# Patient Record
Sex: FEMALE | Race: WHITE | NOT HISPANIC OR LATINO | URBAN - METROPOLITAN AREA
[De-identification: names, ages, dates, MRNs, and addresses within clinical notes are randomized per-mention and may not be internally consistent; named-entity substitution may affect disease eponyms.]

---

## 2023-10-18 ENCOUNTER — APPOINTMENT (OUTPATIENT)
Dept: PRIMARY CARE | Facility: CLINIC | Age: 53
End: 2023-10-18
Payer: MEDICAID

## 2024-04-23 ENCOUNTER — APPOINTMENT (OUTPATIENT)
Dept: PRIMARY CARE | Facility: CLINIC | Age: 54
End: 2024-04-23
Payer: MEDICAID

## 2024-06-11 ENCOUNTER — OFFICE VISIT (OUTPATIENT)
Dept: PRIMARY CARE | Facility: CLINIC | Age: 54
End: 2024-06-11
Payer: MEDICAID

## 2024-06-11 VITALS
HEART RATE: 84 BPM | WEIGHT: 189.8 LBS | BODY MASS INDEX: 31.62 KG/M2 | HEIGHT: 65 IN | SYSTOLIC BLOOD PRESSURE: 108 MMHG | DIASTOLIC BLOOD PRESSURE: 72 MMHG

## 2024-06-11 DIAGNOSIS — Z23 ENCOUNTER FOR IMMUNIZATION: ICD-10-CM

## 2024-06-11 DIAGNOSIS — Z12.31 ENCOUNTER FOR SCREENING MAMMOGRAM FOR BREAST CANCER: ICD-10-CM

## 2024-06-11 DIAGNOSIS — F41.9 ANXIETY: ICD-10-CM

## 2024-06-11 DIAGNOSIS — C56.2 MALIGNANT NEOPLASM OF LEFT OVARY (MULTI): ICD-10-CM

## 2024-06-11 DIAGNOSIS — R22.1 NECK MASS: ICD-10-CM

## 2024-06-11 DIAGNOSIS — F17.210 CIGARETTE NICOTINE DEPENDENCE WITHOUT COMPLICATION: ICD-10-CM

## 2024-06-11 DIAGNOSIS — Z00.00 ROUTINE GENERAL MEDICAL EXAMINATION AT A HEALTH CARE FACILITY: ICD-10-CM

## 2024-06-11 DIAGNOSIS — F32.0 CURRENT MILD EPISODE OF MAJOR DEPRESSIVE DISORDER WITHOUT PRIOR EPISODE (CMS-HCC): ICD-10-CM

## 2024-06-11 DIAGNOSIS — Z12.11 ENCOUNTER FOR SCREENING FOR MALIGNANT NEOPLASM OF COLON: ICD-10-CM

## 2024-06-11 DIAGNOSIS — M06.9 RHEUMATOID ARTHRITIS, INVOLVING UNSPECIFIED SITE, UNSPECIFIED WHETHER RHEUMATOID FACTOR PRESENT (MULTI): Primary | ICD-10-CM

## 2024-06-11 PROCEDURE — 90471 IMMUNIZATION ADMIN: CPT | Performed by: STUDENT IN AN ORGANIZED HEALTH CARE EDUCATION/TRAINING PROGRAM

## 2024-06-11 PROCEDURE — 90677 PCV20 VACCINE IM: CPT | Performed by: STUDENT IN AN ORGANIZED HEALTH CARE EDUCATION/TRAINING PROGRAM

## 2024-06-11 PROCEDURE — 99204 OFFICE O/P NEW MOD 45 MIN: CPT | Performed by: STUDENT IN AN ORGANIZED HEALTH CARE EDUCATION/TRAINING PROGRAM

## 2024-06-11 RX ORDER — ESCITALOPRAM OXALATE 20 MG/1
20 TABLET ORAL DAILY
COMMUNITY
Start: 2023-10-26

## 2024-06-11 RX ORDER — SENNOSIDES 8.6 MG/1
8.6 TABLET ORAL DAILY PRN
COMMUNITY
Start: 2023-01-12

## 2024-06-11 RX ORDER — ACETAMINOPHEN 325 MG/1
TABLET ORAL EVERY 6 HOURS PRN
COMMUNITY

## 2024-06-11 RX ORDER — DOCUSATE SODIUM 100 MG/1
100 CAPSULE, LIQUID FILLED ORAL 2 TIMES DAILY PRN
COMMUNITY

## 2024-06-11 ASSESSMENT — ENCOUNTER SYMPTOMS
FLANK PAIN: 0
FEVER: 0
CHILLS: 0
DIARRHEA: 0
COUGH: 0
HEADACHES: 0
EYE PAIN: 0
NERVOUS/ANXIOUS: 0
SHORTNESS OF BREATH: 0
DYSPHORIC MOOD: 0
EYE REDNESS: 0
DIZZINESS: 0
NUMBNESS: 0
VOMITING: 0
PALPITATIONS: 0
BLOOD IN STOOL: 0
NAUSEA: 0
CONSTIPATION: 0
DYSURIA: 0
RHINORRHEA: 0
ABDOMINAL PAIN: 0
ARTHRALGIAS: 1

## 2024-06-11 ASSESSMENT — PATIENT HEALTH QUESTIONNAIRE - PHQ9
SUM OF ALL RESPONSES TO PHQ9 QUESTIONS 1 AND 2: 4
2. FEELING DOWN, DEPRESSED OR HOPELESS: MORE THAN HALF THE DAYS
1. LITTLE INTEREST OR PLEASURE IN DOING THINGS: MORE THAN HALF THE DAYS

## 2024-06-11 NOTE — ASSESSMENT & PLAN NOTE
Encouraged her to follow up with Dr. Ramos. She was instructed to let us know if she has any trouble scheduling follow up appt.

## 2024-06-11 NOTE — PROGRESS NOTES
Subjective   Patient ID: Janee Duval is a 53 y.o. female who presents for establish care. Previous provider in [place] in 2009. Moved [place] -> [place] -> [place] -> [place] 2020.    HPI  Ovarian cancer - initially started as thoracic back pain, pain was progressive so eventually went to ED and had CTAP and then TVUS which revealed pelvic mass, was referred to local gynecology (Dr. Cortez), was further referred to gynonc (Dr. Estrella), underwent ODALYS BSO 1/10/2023, had reaction to initial chemo regimen but ultimately completed 6 cycles of carboplatin 6/29/2023, states was told she was in remission end of July 2023, she now follows with Dr. Estrella q3mo, last eval 5/2/2024, CTAP with CESIA 4/25/2024, scheduled to see again 8/22/2024    RA - was diagnosed age 14, but she states didn't realize this until recently when her mother reminded her, she is not sure about treatment as adolescent, pain is mainly localized to knees bilaterally but does have diffuse joint pain that started getting worse prior to her cancer diagnosis, struggles with stiffness in the hands in the mornings, does get swelling in the lower extremities, knees, hands    Posterior neck mess - first noticed as teenager, was evaluated by ENT (Dr. Ramos) 12/20/2022, CT was done which was c/w possible epithelial inclusion cyst, getting more uncomfortable with time but she's not sure if it has grown in size, planning to call Dr. Ramos's office to schedule follow up as she was treated for ovarian cancer soon after her CT    Anx/dep - was started on Lexapro by gynonc which seems to be helping, does have hx SI but Lexapro helping significantly, feeling more balanced with the medication    Cervical Cancer Screening: s/p hyst, follows with gynonc  Breast Cancer Screening: paternal aunt with breast ca, due  Osteoporosis Screening: plan to start at age 65  Colon Cancer Screening: no fhx, asymptomatic, due for initial  Tobacco: 10-15 pack-yr hx, down to about 4 cigarettes per day  Alcohol:  "social, occasional  Recreational Drugs: denies  Immunizations: due for TDaP, [id number], Shingrix, she will consider Shingrix, [id number] today    Review of Systems   Constitutional:  Negative for chills and fever.   HENT:  Negative for congestion and rhinorrhea.    Eyes:  Negative for pain and redness.   Respiratory:  Negative for cough and shortness of breath.    Cardiovascular:  Positive for leg swelling. Negative for chest pain and palpitations.   Gastrointestinal:  Negative for abdominal pain, blood in stool, constipation, diarrhea, nausea and vomiting.   Endocrine: Negative for cold intolerance and heat intolerance.   Genitourinary:  Negative for dysuria and flank pain.   Musculoskeletal:  Positive for arthralgias.   Skin:  Negative for rash.   Neurological:  Negative for dizziness, numbness and headaches.   Psychiatric/Behavioral:  Negative for dysphoric mood. The patient is not nervous/anxious.      Objective   /72   Pulse 84   Ht 1.645 m (5' 4.75\")   Wt 86.1 kg (189 lb 12.8 oz)   BMI 31.83 kg/m²     Physical Exam  Vitals reviewed.   Constitutional:       General: She is not in acute distress.     Appearance: Normal appearance.   HENT:      Head: Normocephalic and atraumatic.      Right Ear: Tympanic membrane, ear canal and external ear normal.      Left Ear: Tympanic membrane, ear canal and external ear normal.   Eyes:      General: No scleral icterus.     Conjunctiva/sclera: Conjunctivae normal.   Neck:      Comments: 6cm firm mobile mass posterior neck, nontender  Cardiovascular:      Rate and Rhythm: Normal rate and regular rhythm.   Pulmonary:      Effort: Pulmonary effort is normal. No respiratory distress.      Breath sounds: Normal breath sounds.   Abdominal:      General: Bowel sounds are normal. There is no distension.      Palpations: Abdomen is soft.      Tenderness: There is no abdominal tenderness. There is no guarding or rebound.   Musculoskeletal:         General: Swelling (trace " pitting edema bl lower extremities to the mid calf) present. No deformity.      Cervical back: Normal range of motion and neck supple.   Skin:     General: Skin is warm and dry.      Findings: No rash.   Neurological:      General: No focal deficit present.      Mental Status: She is alert.   Psychiatric:         Mood and Affect: Mood normal.         Behavior: Behavior normal.       Assessment/Plan   Problem List Items Addressed This Visit             ICD-10-CM    Rheumatoid arthritis (Multi) - Primary M06.9     Diagnosed as adolescent per pt report. Currently struggling with diffuse joint pain (knees worst), stiffness, swelling. We will start by updating labs and will follow up with results. Will plan for rheumatology eval pending lab results and clinical course.         Relevant Orders    CBC and Auto Differential    Comprehensive Metabolic Panel    VENANCIO with Reflex to KAYLA    C-Reactive Protein    Rheumatoid Factor    Sedimentation Rate    Neck mass R22.1     Encouraged her to follow up with Dr. Ramos. She was instructed to let us know if she has any trouble scheduling follow up appt.         Malignant neoplasm of left ovary (Multi) C56.2     Continue close follow up with gynonc as previously scheduled for 8/2024.         Current mild episode of major depressive disorder without prior episode (CMS-HCC) F32.0     Well-controlled on current dose of Lexapro. Will continue.         Cigarette nicotine dependence without complication F17.210     She is working on weaning. Down to 4 cigarettes per day. Encouraged complete cessation.         Anxiety F41.9     Well-controlled on current dose of Lexapro. Will continue.          Other Visit Diagnoses         Codes    Routine general medical examination at a health care facility     Z00.00    Relevant Orders    CBC and Auto Differential    Comprehensive Metabolic Panel    Lipid Panel    VENANCIO with Reflex to KAYLA    C-Reactive Protein    Rheumatoid Factor    Sedimentation Rate     Follow Up In Primary Care - Established    Encounter for screening mammogram for breast cancer     Z12.31    Relevant Orders    BI mammo bilateral screening tomosynthesis    Follow Up In Primary Care - Established    Encounter for screening for malignant neoplasm of colon     Z12.11    Relevant Orders    Referral to Gastroenterology    Follow Up In Primary Care - Established    Encounter for immunization     Z23    Relevant Orders    Pneumococcal conjugate vaccine, 20-valent (PREVNAR 20) (Completed)    Follow Up In Primary Care - Established        Follow up in 3mo for recheck, sooner if needed.

## 2024-06-11 NOTE — ASSESSMENT & PLAN NOTE
Diagnosed as adolescent per pt report. Currently struggling with diffuse joint pain (knees worst), stiffness, swelling. We will start by updating labs and will follow up with results. Will plan for rheumatology eval pending lab results and clinical course.

## 2024-06-18 ENCOUNTER — HOSPITAL ENCOUNTER (OUTPATIENT)
Dept: RADIOLOGY | Facility: CLINIC | Age: 54
Discharge: HOME | End: 2024-06-18
Payer: MEDICAID

## 2024-06-18 VITALS — WEIGHT: 189 LBS | BODY MASS INDEX: 32.27 KG/M2 | HEIGHT: 64 IN

## 2024-06-18 DIAGNOSIS — Z12.31 ENCOUNTER FOR SCREENING MAMMOGRAM FOR BREAST CANCER: ICD-10-CM

## 2024-06-18 PROCEDURE — 77063 BREAST TOMOSYNTHESIS BI: CPT | Performed by: RADIOLOGY

## 2024-06-18 PROCEDURE — 77067 SCR MAMMO BI INCL CAD: CPT

## 2024-06-18 PROCEDURE — 77067 SCR MAMMO BI INCL CAD: CPT | Performed by: RADIOLOGY

## 2024-06-19 ENCOUNTER — HOSPITAL ENCOUNTER (OUTPATIENT)
Dept: RADIOLOGY | Facility: EXTERNAL LOCATION | Age: 54
Discharge: HOME | End: 2024-06-19

## 2024-06-25 ENCOUNTER — TELEPHONE (OUTPATIENT)
Dept: PRIMARY CARE | Facility: CLINIC | Age: 54
End: 2024-06-25
Payer: MEDICAID

## 2024-06-25 NOTE — TELEPHONE ENCOUNTER
---- Message -----   From: Aria Ny DO   Sent: 6/21/2024   To: Janee Duval   Subject: Unread Message Notification                      Good morning Janee! Mammogram looks good. We'll plan to repeat again in a year. Hope you have a nice weekend!         Pt notified

## 2024-09-05 ENCOUNTER — LAB (OUTPATIENT)
Dept: LAB | Facility: LAB | Age: 54
End: 2024-09-05
Payer: MEDICAID

## 2024-09-05 DIAGNOSIS — M06.9 RHEUMATOID ARTHRITIS, INVOLVING UNSPECIFIED SITE, UNSPECIFIED WHETHER RHEUMATOID FACTOR PRESENT (MULTI): ICD-10-CM

## 2024-09-05 DIAGNOSIS — Z00.00 ROUTINE GENERAL MEDICAL EXAMINATION AT A HEALTH CARE FACILITY: ICD-10-CM

## 2024-09-05 LAB
ALBUMIN SERPL BCP-MCNC: 4.1 G/DL (ref 3.4–5)
ALP SERPL-CCNC: 106 U/L (ref 33–110)
ALT SERPL W P-5'-P-CCNC: 47 U/L (ref 7–45)
ANION GAP SERPL CALC-SCNC: 13 MMOL/L (ref 10–20)
AST SERPL W P-5'-P-CCNC: 26 U/L (ref 9–39)
BASOPHILS # BLD AUTO: 0.1 X10*3/UL (ref 0–0.1)
BASOPHILS NFR BLD AUTO: 1.2 %
BILIRUB SERPL-MCNC: 0.4 MG/DL (ref 0–1.2)
BUN SERPL-MCNC: 13 MG/DL (ref 6–23)
CALCIUM SERPL-MCNC: 9.3 MG/DL (ref 8.6–10.3)
CHLORIDE SERPL-SCNC: 103 MMOL/L (ref 98–107)
CHOLEST SERPL-MCNC: 235 MG/DL (ref 0–199)
CHOLESTEROL/HDL RATIO: 6.7
CO2 SERPL-SCNC: 28 MMOL/L (ref 21–32)
CREAT SERPL-MCNC: 0.68 MG/DL (ref 0.5–1.05)
CRP SERPL-MCNC: 0.61 MG/DL
EGFRCR SERPLBLD CKD-EPI 2021: >90 ML/MIN/1.73M*2
EOSINOPHIL # BLD AUTO: 0.61 X10*3/UL (ref 0–0.7)
EOSINOPHIL NFR BLD AUTO: 7.3 %
ERYTHROCYTE [DISTWIDTH] IN BLOOD BY AUTOMATED COUNT: 13.1 % (ref 11.5–14.5)
ERYTHROCYTE [SEDIMENTATION RATE] IN BLOOD BY WESTERGREN METHOD: 15 MM/H (ref 0–30)
GLUCOSE SERPL-MCNC: 131 MG/DL (ref 74–99)
HCT VFR BLD AUTO: 41.9 % (ref 36–46)
HDLC SERPL-MCNC: 35 MG/DL
HGB BLD-MCNC: 13.5 G/DL (ref 12–16)
IMM GRANULOCYTES # BLD AUTO: 0.02 X10*3/UL (ref 0–0.7)
IMM GRANULOCYTES NFR BLD AUTO: 0.2 % (ref 0–0.9)
LDLC SERPL CALC-MCNC: 145 MG/DL
LYMPHOCYTES # BLD AUTO: 1.88 X10*3/UL (ref 1.2–4.8)
LYMPHOCYTES NFR BLD AUTO: 22.6 %
MCH RBC QN AUTO: 31.1 PG (ref 26–34)
MCHC RBC AUTO-ENTMCNC: 32.2 G/DL (ref 32–36)
MCV RBC AUTO: 97 FL (ref 80–100)
MONOCYTES # BLD AUTO: 0.65 X10*3/UL (ref 0.1–1)
MONOCYTES NFR BLD AUTO: 7.8 %
NEUTROPHILS # BLD AUTO: 5.07 X10*3/UL (ref 1.2–7.7)
NEUTROPHILS NFR BLD AUTO: 60.9 %
NON HDL CHOLESTEROL: 200 MG/DL (ref 0–149)
NRBC BLD-RTO: 0 /100 WBCS (ref 0–0)
PLATELET # BLD AUTO: 354 X10*3/UL (ref 150–450)
POTASSIUM SERPL-SCNC: 4.4 MMOL/L (ref 3.5–5.3)
PROT SERPL-MCNC: 6.5 G/DL (ref 6.4–8.2)
RBC # BLD AUTO: 4.34 X10*6/UL (ref 4–5.2)
SODIUM SERPL-SCNC: 140 MMOL/L (ref 136–145)
TRIGL SERPL-MCNC: 275 MG/DL (ref 0–149)
VLDL: 55 MG/DL (ref 0–40)
WBC # BLD AUTO: 8.3 X10*3/UL (ref 4.4–11.3)

## 2024-09-05 PROCEDURE — 36415 COLL VENOUS BLD VENIPUNCTURE: CPT

## 2024-09-05 PROCEDURE — 86140 C-REACTIVE PROTEIN: CPT

## 2024-09-05 PROCEDURE — 85652 RBC SED RATE AUTOMATED: CPT

## 2024-09-05 PROCEDURE — 85025 COMPLETE CBC W/AUTO DIFF WBC: CPT

## 2024-09-05 PROCEDURE — 86431 RHEUMATOID FACTOR QUANT: CPT

## 2024-09-05 PROCEDURE — 80061 LIPID PANEL: CPT

## 2024-09-05 PROCEDURE — 86038 ANTINUCLEAR ANTIBODIES: CPT

## 2024-09-05 PROCEDURE — 86235 NUCLEAR ANTIGEN ANTIBODY: CPT

## 2024-09-05 PROCEDURE — 80053 COMPREHEN METABOLIC PANEL: CPT

## 2024-09-05 PROCEDURE — 86225 DNA ANTIBODY NATIVE: CPT

## 2024-09-06 LAB
ANA PATTERN: ABNORMAL
ANA SER QL HEP2 SUBST: POSITIVE
ANA TITR SER IF: ABNORMAL {TITER}
CENTROMERE B AB SER-ACNC: <0.2 AI
CHROMATIN AB SERPL-ACNC: <0.2 AI
DSDNA AB SER-ACNC: 1 IU/ML
ENA JO1 AB SER QL IA: <0.2 AI
ENA RNP AB SER IA-ACNC: <0.2 AI
ENA SCL70 AB SER QL IA: 1.2 AI
ENA SM AB SER IA-ACNC: <0.2 AI
ENA SM+RNP AB SER QL IA: <0.2 AI
ENA SS-A AB SER IA-ACNC: 0.2 AI
ENA SS-B AB SER IA-ACNC: <0.2 AI
RHEUMATOID FACT SER NEPH-ACNC: <10 IU/ML (ref 0–15)
RIBOSOMAL P AB SER-ACNC: <0.2 AI

## 2024-09-11 ENCOUNTER — APPOINTMENT (OUTPATIENT)
Dept: PRIMARY CARE | Facility: CLINIC | Age: 54
End: 2024-09-11
Payer: MEDICAID

## 2024-09-11 VITALS
BODY MASS INDEX: 33.67 KG/M2 | DIASTOLIC BLOOD PRESSURE: 74 MMHG | HEIGHT: 64 IN | HEART RATE: 84 BPM | SYSTOLIC BLOOD PRESSURE: 120 MMHG | WEIGHT: 197.2 LBS

## 2024-09-11 DIAGNOSIS — E78.5 DYSLIPIDEMIA: Primary | ICD-10-CM

## 2024-09-11 DIAGNOSIS — R06.83 SNORING: ICD-10-CM

## 2024-09-11 DIAGNOSIS — M06.9 RHEUMATOID ARTHRITIS, INVOLVING UNSPECIFIED SITE, UNSPECIFIED WHETHER RHEUMATOID FACTOR PRESENT (MULTI): ICD-10-CM

## 2024-09-11 DIAGNOSIS — R73.01 ELEVATED FASTING GLUCOSE: ICD-10-CM

## 2024-09-11 DIAGNOSIS — I89.0 LYMPHEDEMA: ICD-10-CM

## 2024-09-11 DIAGNOSIS — R40.0 DAYTIME SOMNOLENCE: ICD-10-CM

## 2024-09-11 DIAGNOSIS — R21 RASH: ICD-10-CM

## 2024-09-11 DIAGNOSIS — F17.210 CIGARETTE NICOTINE DEPENDENCE WITHOUT COMPLICATION: ICD-10-CM

## 2024-09-11 PROCEDURE — 3008F BODY MASS INDEX DOCD: CPT | Performed by: STUDENT IN AN ORGANIZED HEALTH CARE EDUCATION/TRAINING PROGRAM

## 2024-09-11 PROCEDURE — 99214 OFFICE O/P EST MOD 30 MIN: CPT | Performed by: STUDENT IN AN ORGANIZED HEALTH CARE EDUCATION/TRAINING PROGRAM

## 2024-09-11 NOTE — PROGRESS NOTES
Subjective   Patient ID: Janee Duval is a 53 y.o. female who presents for 3 month check with labs     HPI  Lymphedema - bilateral lower extremities, was referred to PT by gynonc, now tx with LE compression, going to PT weekly    Rash - started on L foot, now also on R foot, started a few months ago, itches, she has tried otc hydrocortisone which has helped with the itching, she did bring up to her specialty team who rx triamcinolone, she has not yet picked up from pharmacy, she denies new known exposures to the area    Fatigue - reports significant fatigue since chemo tx, she does snore, denies PND    HLD - FLP above goal with , she prefers to focus on dietary modification, does not eat much red meat or fried foods, uses air fryer, drinks whole milk most days and uses in her coffee    Smoking cessation - continues to work on weaning    RA - pt mother is present today and states that she was tried on multiple different medications for RA as a child, she has not followed with rheumatology for some years, VENANCIO+ on recent labs, she is interested in reestablishment with rheumatology    Cervical Cancer Screening: s/p hyst, follows with [facility]  Breast Cancer Screening: due 6/2025  Osteoporosis Screening: plan to start at age 65  Colon Cancer Screening: scheduled with Dr. Ortiz 10/14/2024  Tobacco: 20 pack-yr hx, down to about 4 cigarettes per day, due for LDCT  Alcohol: social, occasional  Recreational Drugs: denies  Immunizations: due for TDaP and Shingrix, she will consider Shingrix    Review of Systems   Constitutional:  Negative for chills and fever.   Respiratory:  Negative for cough and shortness of breath.    Cardiovascular:  Positive for leg swelling. Negative for chest pain and palpitations.   Musculoskeletal:  Positive for arthralgias.   Skin:  Positive for rash.   Psychiatric/Behavioral:  Negative for dysphoric mood. The patient is not nervous/anxious.      Objective   /74   Pulse 84   Ht  "1.626 m (5' 4\")   Wt 89.4 kg (197 lb 3.2 oz)   BMI 33.85 kg/m²     Physical Exam  Constitutional:       Appearance: Normal appearance.   HENT:      Head: Normocephalic and atraumatic.   Eyes:      General: No scleral icterus.     Conjunctiva/sclera: Conjunctivae normal.   Cardiovascular:      Rate and Rhythm: Normal rate and regular rhythm.      Heart sounds: No murmur heard.  Pulmonary:      Effort: Pulmonary effort is normal. No respiratory distress.      Breath sounds: Normal breath sounds.   Musculoskeletal:      Cervical back: Normal range of motion and neck supple.   Skin:     General: Skin is warm and dry.      Findings: Rash (aculopapular rash tops of feet bl) present.   Neurological:      General: No focal deficit present.      Mental Status: She is alert.   Psychiatric:         Mood and Affect: Mood normal.         Behavior: Behavior normal.       Assessment/Plan   Problem List Items Addressed This Visit             ICD-10-CM    Snoring R06.83     Snoring, fatigue, daytime somnolence. Using shared decision making, we decided to proceed with HST to evaluate for underlying RAZ.         Relevant Orders    Home sleep apnea test (HSAT)    Follow Up In Primary Care - Established    Rheumatoid arthritis (Multi) M06.9     Using shared decision making, we decided to proceed with rheumatology reestablishment.         Relevant Orders    Referral to Rheumatology    Follow Up In Primary Care - Established    Rash R21     Maculopapular rash on feet bilaterally L>R. Unclear etiology at this time. I agree with trial of topical steroid. Medication dosing and side effects reviewed. Return precautions reviewed.          Lymphedema I89.0     Continue close follow up with lymphedema clinic as previously scheduled.         Elevated fasting glucose R73.01     . Will check HbA1c for further characterization.         Relevant Orders    Hemoglobin A1C    Comprehensive Metabolic Panel    Follow Up In Primary Care - " Established    Dyslipidemia - Primary E78.5     FLP reviewed. Statin indicated due to ASCVD 10-yr risk. She prefers to focus on lifestyle modifications first. Will repeat FLP prior to next appt.         Relevant Orders    Comprehensive Metabolic Panel    Lipid Panel    Follow Up In Primary Care - Established    Daytime somnolence R40.0     HST.         Relevant Orders    Home sleep apnea test (HSAT)    Follow Up In Primary Care - Established    Cigarette nicotine dependence without complication F17.210     Encouraged complete smoking cessation. Eligible for LDCT and is agreeable.         Relevant Orders    CT lung screening low dose    Follow Up In Primary Care - Established   Follow up in 3mo for recheck, sooner if needed.

## 2024-09-11 NOTE — LETTER
September 13, 2024     Patient: Janee Duval   YOB: 1970   Date of Visit: 9/11/2024       To Whom It May Concern:    Janee Duval was seen in my clinic on 9/11/2024 at 11:20 am. She is under my care for her chronic medical conditions. Due to these conditions, I do not feel that she is fit for work at this time    If you have any questions or concerns, please don't hesitate to call.         Sincerely,         Aria Ny, DO

## 2024-09-15 PROBLEM — E78.5 DYSLIPIDEMIA: Status: ACTIVE | Noted: 2024-09-15

## 2024-09-15 PROBLEM — R40.0 DAYTIME SOMNOLENCE: Status: ACTIVE | Noted: 2024-09-15

## 2024-09-15 PROBLEM — I89.0 LYMPHEDEMA: Status: ACTIVE | Noted: 2024-09-15

## 2024-09-15 PROBLEM — R06.83 SNORING: Status: ACTIVE | Noted: 2024-09-15

## 2024-09-15 PROBLEM — R21 RASH: Status: ACTIVE | Noted: 2024-09-15

## 2024-09-15 PROBLEM — R73.01 ELEVATED FASTING GLUCOSE: Status: ACTIVE | Noted: 2024-09-15

## 2024-09-15 ASSESSMENT — ENCOUNTER SYMPTOMS
DYSPHORIC MOOD: 0
SHORTNESS OF BREATH: 0
PALPITATIONS: 0
ARTHRALGIAS: 1
FEVER: 0
CHILLS: 0
NERVOUS/ANXIOUS: 0
COUGH: 0

## 2024-09-16 NOTE — ASSESSMENT & PLAN NOTE
Maculopapular rash on feet bilaterally L>R. Unclear etiology at this time. I agree with trial of topical steroid. Medication dosing and side effects reviewed. Return precautions reviewed.

## 2024-09-16 NOTE — ASSESSMENT & PLAN NOTE
Snoring, fatigue, daytime somnolence. Using shared decision making, we decided to proceed with HST to evaluate for underlying RAZ.

## 2024-09-16 NOTE — ASSESSMENT & PLAN NOTE
FLP reviewed. Statin indicated due to ASCVD 10-yr risk. She prefers to focus on lifestyle modifications first. Will repeat FLP prior to next appt.

## 2024-10-01 DIAGNOSIS — M06.9 RHEUMATOID ARTHRITIS, INVOLVING UNSPECIFIED SITE, UNSPECIFIED WHETHER RHEUMATOID FACTOR PRESENT (MULTI): Primary | ICD-10-CM

## 2024-10-08 ENCOUNTER — TELEPHONE (OUTPATIENT)
Dept: PRIMARY CARE | Facility: CLINIC | Age: 54
End: 2024-10-08
Payer: MEDICAID

## 2024-10-08 NOTE — TELEPHONE ENCOUNTER
----- Message from Aria Ny sent at 10/7/2024  7:09 AM EDT -----  Please let Janee know that her lung scan did not show any concerning lung nodules. Good news. We will plan to repeat in 1yr. Thank you

## 2024-12-05 ENCOUNTER — LAB (OUTPATIENT)
Dept: LAB | Facility: LAB | Age: 54
End: 2024-12-05
Payer: MEDICAID

## 2024-12-05 DIAGNOSIS — E78.5 DYSLIPIDEMIA: ICD-10-CM

## 2024-12-05 DIAGNOSIS — R73.01 ELEVATED FASTING GLUCOSE: ICD-10-CM

## 2024-12-05 LAB
ALBUMIN SERPL BCP-MCNC: 3.9 G/DL (ref 3.4–5)
ALP SERPL-CCNC: 104 U/L (ref 33–110)
ALT SERPL W P-5'-P-CCNC: 36 U/L (ref 7–45)
ANION GAP SERPL CALC-SCNC: 7 MMOL/L (ref 10–20)
AST SERPL W P-5'-P-CCNC: 23 U/L (ref 9–39)
BILIRUB SERPL-MCNC: 0.3 MG/DL (ref 0–1.2)
BUN SERPL-MCNC: 11 MG/DL (ref 6–23)
CALCIUM SERPL-MCNC: 9.3 MG/DL (ref 8.6–10.3)
CHLORIDE SERPL-SCNC: 105 MMOL/L (ref 98–107)
CHOLEST SERPL-MCNC: 228 MG/DL (ref 0–199)
CHOLESTEROL/HDL RATIO: 6.5
CO2 SERPL-SCNC: 30 MMOL/L (ref 21–32)
CREAT SERPL-MCNC: 0.6 MG/DL (ref 0.5–1.05)
EGFRCR SERPLBLD CKD-EPI 2021: >90 ML/MIN/1.73M*2
GLUCOSE SERPL-MCNC: 122 MG/DL (ref 74–99)
HDLC SERPL-MCNC: 35 MG/DL
LDLC SERPL CALC-MCNC: 133 MG/DL
NON HDL CHOLESTEROL: 193 MG/DL (ref 0–149)
POTASSIUM SERPL-SCNC: 4.3 MMOL/L (ref 3.5–5.3)
PROT SERPL-MCNC: 6.8 G/DL (ref 6.4–8.2)
SODIUM SERPL-SCNC: 138 MMOL/L (ref 136–145)
TRIGL SERPL-MCNC: 302 MG/DL (ref 0–149)
VLDL: 60 MG/DL (ref 0–40)

## 2024-12-05 PROCEDURE — 80053 COMPREHEN METABOLIC PANEL: CPT

## 2024-12-05 PROCEDURE — 83036 HEMOGLOBIN GLYCOSYLATED A1C: CPT

## 2024-12-05 PROCEDURE — 80061 LIPID PANEL: CPT

## 2024-12-05 PROCEDURE — 36415 COLL VENOUS BLD VENIPUNCTURE: CPT

## 2024-12-06 LAB
EST. AVERAGE GLUCOSE BLD GHB EST-MCNC: 154 MG/DL
HBA1C MFR BLD: 7 %

## 2024-12-11 ENCOUNTER — APPOINTMENT (OUTPATIENT)
Dept: PRIMARY CARE | Facility: CLINIC | Age: 54
End: 2024-12-11
Payer: MEDICAID

## 2024-12-11 VITALS
HEART RATE: 94 BPM | OXYGEN SATURATION: 97 % | HEIGHT: 64 IN | WEIGHT: 193.6 LBS | SYSTOLIC BLOOD PRESSURE: 108 MMHG | DIASTOLIC BLOOD PRESSURE: 60 MMHG | BODY MASS INDEX: 33.05 KG/M2

## 2024-12-11 DIAGNOSIS — E11.9 TYPE 2 DIABETES MELLITUS WITHOUT COMPLICATION, WITHOUT LONG-TERM CURRENT USE OF INSULIN (MULTI): Primary | ICD-10-CM

## 2024-12-11 DIAGNOSIS — R09.81 NASAL CONGESTION: ICD-10-CM

## 2024-12-11 DIAGNOSIS — E78.5 DYSLIPIDEMIA: ICD-10-CM

## 2024-12-11 DIAGNOSIS — M06.9 RHEUMATOID ARTHRITIS, INVOLVING UNSPECIFIED SITE, UNSPECIFIED WHETHER RHEUMATOID FACTOR PRESENT (MULTI): ICD-10-CM

## 2024-12-11 DIAGNOSIS — G47.33 OSA (OBSTRUCTIVE SLEEP APNEA): ICD-10-CM

## 2024-12-11 DIAGNOSIS — F17.210 CIGARETTE NICOTINE DEPENDENCE WITHOUT COMPLICATION: ICD-10-CM

## 2024-12-11 DIAGNOSIS — K21.9 GASTROESOPHAGEAL REFLUX DISEASE, UNSPECIFIED WHETHER ESOPHAGITIS PRESENT: ICD-10-CM

## 2024-12-11 PROBLEM — R73.01 ELEVATED FASTING GLUCOSE: Status: RESOLVED | Noted: 2024-09-15 | Resolved: 2024-12-11

## 2024-12-11 PROBLEM — R06.83 SNORING: Status: RESOLVED | Noted: 2024-09-15 | Resolved: 2024-12-11

## 2024-12-11 PROBLEM — R21 RASH: Status: RESOLVED | Noted: 2024-09-15 | Resolved: 2024-12-11

## 2024-12-11 PROBLEM — R40.0 DAYTIME SOMNOLENCE: Status: RESOLVED | Noted: 2024-09-15 | Resolved: 2024-12-11

## 2024-12-11 PROCEDURE — 99214 OFFICE O/P EST MOD 30 MIN: CPT | Performed by: STUDENT IN AN ORGANIZED HEALTH CARE EDUCATION/TRAINING PROGRAM

## 2024-12-11 PROCEDURE — 3050F LDL-C >= 130 MG/DL: CPT | Performed by: STUDENT IN AN ORGANIZED HEALTH CARE EDUCATION/TRAINING PROGRAM

## 2024-12-11 PROCEDURE — 3078F DIAST BP <80 MM HG: CPT | Performed by: STUDENT IN AN ORGANIZED HEALTH CARE EDUCATION/TRAINING PROGRAM

## 2024-12-11 PROCEDURE — 3074F SYST BP LT 130 MM HG: CPT | Performed by: STUDENT IN AN ORGANIZED HEALTH CARE EDUCATION/TRAINING PROGRAM

## 2024-12-11 PROCEDURE — 3051F HG A1C>EQUAL 7.0%<8.0%: CPT | Performed by: STUDENT IN AN ORGANIZED HEALTH CARE EDUCATION/TRAINING PROGRAM

## 2024-12-11 PROCEDURE — 3008F BODY MASS INDEX DOCD: CPT | Performed by: STUDENT IN AN ORGANIZED HEALTH CARE EDUCATION/TRAINING PROGRAM

## 2024-12-11 RX ORDER — FAMOTIDINE 20 MG/1
20 TABLET, FILM COATED ORAL NIGHTLY
Qty: 90 TABLET | Refills: 1 | Status: SHIPPED | OUTPATIENT
Start: 2024-12-11

## 2024-12-11 RX ORDER — HYDROCODONE BITARTRATE AND ACETAMINOPHEN 5; 325 MG/1; MG/1
1 TABLET ORAL EVERY 6 HOURS PRN
COMMUNITY
Start: 2024-11-01

## 2024-12-11 RX ORDER — FLUTICASONE PROPIONATE 50 MCG
1 SPRAY, SUSPENSION (ML) NASAL DAILY
Qty: 16 G | Refills: 1 | Status: SHIPPED | OUTPATIENT
Start: 2024-12-11 | End: 2025-12-11

## 2024-12-11 RX ORDER — ATORVASTATIN CALCIUM 10 MG/1
10 TABLET, FILM COATED ORAL DAILY
Qty: 90 TABLET | Refills: 3 | Status: SHIPPED | OUTPATIENT
Start: 2024-12-11

## 2024-12-11 ASSESSMENT — ENCOUNTER SYMPTOMS
NERVOUS/ANXIOUS: 0
FEVER: 0
PALPITATIONS: 0
SHORTNESS OF BREATH: 0
DYSPHORIC MOOD: 0
COUGH: 0
CHILLS: 0
SLEEP DISTURBANCE: 1

## 2024-12-11 NOTE — ASSESSMENT & PLAN NOTE
No indication for antibiotic at this time. Will provide rx for nasal steroid to address congestion and PND. Medication dosing and side effects reviewed. Return precautions reviewed.

## 2024-12-11 NOTE — PROGRESS NOTES
"Subjective   Patient ID: Janee Duval is a 54 y.o. female who presents for Follow-up (3 MO FOLLOW UP).    HPI  RAZ - mild RAZ noted on HST, she struggles with significant daytime fatigue and states does not sleep well    DM2 - new diagnosis with HbA1c 7.0%, she admits to not eating regularly, has struggled with low appetite for the past 10yrs, exercise is limited due to chronic pain/RA, she is due for eye check and typically goes to [facility] in [place]    GERD - has chronically struggled with reflux, seems to be getting worse with time, currently eats crackers when she is symptomatic, usually has symptoms at night, occurs when she has been laying down for some time, occasionally wakes her from sleep, not typically symptomatic during the day, she denies abd pain, n/v/d, melena, hematochezia    RA - rheumatology evaluation scheduled for 2/18/2025    Congestion - symptomatic for almost 2wks, started with congestion and cough, cough has improved, she continues to struggle with congestion, no fever or chills, otherwise feeling at her baseline    Cervical Cancer Screening: s/p hyst, follows with gynonc  Breast Cancer Screening: due 6/2025  Osteoporosis Screening: plan to start at age 65  Colon Cancer Screening: Dr. Ortiz 10/14/2024, due 10/2034  Tobacco: 20 pack-yr hx, down to about 4 cigarettes per day, LDCT due 9/2025  Alcohol: social, occasional  Recreational Drugs: denies  Immunizations: due for TDaP and Shingrix, she will consider Shingrix, declines influenza    Review of Systems   Constitutional:  Negative for chills and fever.   HENT:  Positive for congestion and postnasal drip.    Respiratory:  Negative for cough and shortness of breath.    Cardiovascular:  Negative for chest pain and palpitations.   Skin:  Negative for rash.   Psychiatric/Behavioral:  Positive for sleep disturbance. Negative for dysphoric mood. The patient is not nervous/anxious.      Objective   /60   Pulse 94   Ht 1.626 m (5' 4\")   " Wt 87.8 kg (193 lb 9.6 oz)   SpO2 97%   BMI 33.23 kg/m²     Physical Exam  Constitutional:       Appearance: Normal appearance.   HENT:      Head: Normocephalic and atraumatic.      Right Ear: Tympanic membrane, ear canal and external ear normal.      Left Ear: Tympanic membrane, ear canal and external ear normal.      Nose: Congestion present.      Mouth/Throat:      Mouth: Mucous membranes are moist.      Pharynx: No oropharyngeal exudate or posterior oropharyngeal erythema.      Comments: PND noted  Eyes:      General: No scleral icterus.     Conjunctiva/sclera: Conjunctivae normal.   Cardiovascular:      Rate and Rhythm: Normal rate and regular rhythm.      Heart sounds: No murmur heard.  Pulmonary:      Effort: Pulmonary effort is normal. No respiratory distress.      Breath sounds: Normal breath sounds.   Musculoskeletal:      Cervical back: Normal range of motion and neck supple.   Skin:     General: Skin is warm and dry.      Findings: No rash.   Neurological:      General: No focal deficit present.      Mental Status: She is alert.   Psychiatric:         Mood and Affect: Mood normal.         Behavior: Behavior normal.       Assessment/Plan   Problem List Items Addressed This Visit             ICD-10-CM    Type 2 diabetes mellitus without complication, without long-term current use of insulin (Multi) - Primary E11.9     New diagnosis with HbA1c 7.0%. We reviewed diagnosis, goals, and treatment options. Using shared decision making, we decided to proceed with trial of lifestyle modifications with assistance of dietician evaluation. Referral placed. Will plan for metformin trial at next appt if HbA1c not improved. Lifestyle modifications reviewed. She was encouraged to schedule eye check.         Relevant Orders    Referral to Nutrition Services    Albumin-Creatinine Ratio, Urine Random    CBC and Auto Differential    Comprehensive Metabolic Panel    Hemoglobin A1C    Lipid Panel    TSH with reflex to Free  T4 if abnormal    Vitamin B12    Follow Up In Primary Care - Established    Rheumatoid arthritis M06.9     Rheumatology evaluation pending.         Relevant Orders    Follow Up In Primary Care - Established    RAZ (obstructive sleep apnea) G47.33     Will proceed with sleep medicine establishment.         Relevant Orders    Referral to Adult Sleep Medicine    Follow Up In Primary Care - Established    Nasal congestion R09.81     No indication for antibiotic at this time. Will provide rx for nasal steroid to address congestion and PND. Medication dosing and side effects reviewed. Return precautions reviewed.          Relevant Medications    fluticasone (Flonase) 50 mcg/actuation nasal spray    Gastroesophageal reflux disease K21.9     Reviewed lifestyle modifications - avoidance of food triggers, sit upright for 30min after meals, no large meals 2hrs prior to bed. Will trial famotidine at nighttime. Medication dosing and side effects reviewed.          Relevant Medications    famotidine (Pepcid) 20 mg tablet    Other Relevant Orders    Follow Up In Primary Care - Established    Dyslipidemia E78.5     We reviewed indication for statin therapy given new DM2 diagnosis. She is agreeable. Medication dosing and side effects reviewed.          Relevant Medications    atorvastatin (Lipitor) 10 mg tablet    Other Relevant Orders    Comprehensive Metabolic Panel    Lipid Panel    Follow Up In Primary Care - Established    Cigarette nicotine dependence without complication F17.210     Encouraged complete cessation.         Relevant Orders    Follow Up In Primary Care - Established   Follow up in 3mo for recheck, sooner if needed. Labs prior to appt.

## 2024-12-11 NOTE — ASSESSMENT & PLAN NOTE
New diagnosis with HbA1c 7.0%. We reviewed diagnosis, goals, and treatment options. Using shared decision making, we decided to proceed with trial of lifestyle modifications with assistance of dietician evaluation. Referral placed. Will plan for metformin trial at next appt if HbA1c not improved. Lifestyle modifications reviewed. She was encouraged to schedule eye check.

## 2024-12-11 NOTE — ASSESSMENT & PLAN NOTE
We reviewed indication for statin therapy given new DM2 diagnosis. She is agreeable. Medication dosing and side effects reviewed.

## 2025-03-13 ENCOUNTER — APPOINTMENT (OUTPATIENT)
Dept: PRIMARY CARE | Facility: CLINIC | Age: 55
End: 2025-03-13
Payer: MEDICAID

## 2025-03-13 VITALS
WEIGHT: 193.2 LBS | BODY MASS INDEX: 32.98 KG/M2 | HEART RATE: 84 BPM | DIASTOLIC BLOOD PRESSURE: 68 MMHG | HEIGHT: 64 IN | SYSTOLIC BLOOD PRESSURE: 110 MMHG

## 2025-03-13 DIAGNOSIS — E78.5 DYSLIPIDEMIA: ICD-10-CM

## 2025-03-13 DIAGNOSIS — R42 LIGHTHEADEDNESS: ICD-10-CM

## 2025-03-13 DIAGNOSIS — E11.9 TYPE 2 DIABETES MELLITUS WITHOUT COMPLICATION, WITHOUT LONG-TERM CURRENT USE OF INSULIN (MULTI): Primary | ICD-10-CM

## 2025-03-13 DIAGNOSIS — K21.9 GASTROESOPHAGEAL REFLUX DISEASE, UNSPECIFIED WHETHER ESOPHAGITIS PRESENT: ICD-10-CM

## 2025-03-13 DIAGNOSIS — M06.9 RHEUMATOID ARTHRITIS, INVOLVING UNSPECIFIED SITE, UNSPECIFIED WHETHER RHEUMATOID FACTOR PRESENT (MULTI): ICD-10-CM

## 2025-03-13 DIAGNOSIS — R13.10 DYSPHAGIA, UNSPECIFIED TYPE: ICD-10-CM

## 2025-03-13 PROCEDURE — 3078F DIAST BP <80 MM HG: CPT | Performed by: STUDENT IN AN ORGANIZED HEALTH CARE EDUCATION/TRAINING PROGRAM

## 2025-03-13 PROCEDURE — 3008F BODY MASS INDEX DOCD: CPT | Performed by: STUDENT IN AN ORGANIZED HEALTH CARE EDUCATION/TRAINING PROGRAM

## 2025-03-13 PROCEDURE — 99214 OFFICE O/P EST MOD 30 MIN: CPT | Performed by: STUDENT IN AN ORGANIZED HEALTH CARE EDUCATION/TRAINING PROGRAM

## 2025-03-13 PROCEDURE — 3074F SYST BP LT 130 MM HG: CPT | Performed by: STUDENT IN AN ORGANIZED HEALTH CARE EDUCATION/TRAINING PROGRAM

## 2025-03-13 RX ORDER — METFORMIN HYDROCHLORIDE 500 MG/1
500 TABLET, EXTENDED RELEASE ORAL
Qty: 100 TABLET | Refills: 3 | Status: SHIPPED | OUTPATIENT
Start: 2025-03-13 | End: 2026-04-17

## 2025-03-13 RX ORDER — FAMOTIDINE 20 MG/1
20 TABLET, FILM COATED ORAL 2 TIMES DAILY
Qty: 180 TABLET | Refills: 1 | Status: SHIPPED | OUTPATIENT
Start: 2025-03-13

## 2025-03-13 ASSESSMENT — PATIENT HEALTH QUESTIONNAIRE - PHQ9
1. LITTLE INTEREST OR PLEASURE IN DOING THINGS: SEVERAL DAYS
SUM OF ALL RESPONSES TO PHQ9 QUESTIONS 1 AND 2: 2
2. FEELING DOWN, DEPRESSED OR HOPELESS: SEVERAL DAYS

## 2025-03-13 NOTE — PROGRESS NOTES
"Subjective   Patient ID: Janee Duval is a 54 y.o. female who presents for 3 month check with labs     HPI  DM2 - diet-controlled at this time, HbA1c up a little from 7.0% to 7.2%, she has been working with dietician and has been trying to work on lifestyle modifications nicole dietary, she has difficulty with exercise due to joint pian    HLD - statin started at last appt, she is tolerating without adverse effects    RA/joint pain - undergoing workup with rheumatology (Dr. May)    Dysphagia - feels like food gets stuck on the way down, does okay with liquids but states that sometimes feels like she chokes on saliva, occasionally has trouble with her pills    Lightheaded - random brief episodes of lightheadedness, usually with standing but not always, lasts up to a couple minutes at a time, she is drinking only 1-2 bottles of water per day    Review of Systems   Constitutional:  Negative for chills and fever.   Respiratory:  Negative for cough and shortness of breath.    Cardiovascular:  Negative for chest pain and palpitations.   Musculoskeletal:  Positive for arthralgias.   Skin:  Negative for rash.   Neurological:  Positive for light-headedness.   Psychiatric/Behavioral:  Negative for dysphoric mood. The patient is not nervous/anxious.      Objective   /68   Pulse 84   Ht 1.626 m (5' 4\")   Wt 87.6 kg (193 lb 3.2 oz)   BMI 33.16 kg/m²     Physical Exam  Constitutional:       Appearance: Normal appearance.   HENT:      Head: Normocephalic and atraumatic.   Eyes:      General: No scleral icterus.     Conjunctiva/sclera: Conjunctivae normal.   Cardiovascular:      Rate and Rhythm: Normal rate and regular rhythm.   Pulmonary:      Effort: Pulmonary effort is normal. No respiratory distress.      Breath sounds: Normal breath sounds.   Musculoskeletal:      Cervical back: Normal range of motion and neck supple.   Skin:     General: Skin is warm and dry.      Findings: No rash.   Neurological:      General: No " focal deficit present.      Mental Status: She is alert.   Psychiatric:         Mood and Affect: Mood normal.         Behavior: Behavior normal.       Assessment/Plan   Problem List Items Addressed This Visit             ICD-10-CM    Type 2 diabetes mellitus without complication, without long-term current use of insulin (Multi) - Primary E11.9     - Diet-controlled  - HbA1c up from 7.0% to 7.2%  - We reviewed options for pharmacologic management  - Using shared decision making, we decided to trial metformin  - Medication dosing and side effects reviewed.  - She will continue to follow with dietician         Relevant Medications    metFORMIN XR (Glucophage-XR) 500 mg 24 hr tablet    Other Relevant Orders    Comprehensive Metabolic Panel    Hemoglobin A1C    Follow Up In Primary Care - Established    Rheumatoid arthritis M06.9     - Continue close follow up with rheumatology as previously scheduled         Relevant Orders    Follow Up In Primary Care - Established    Lightheadedness R42     - Having brief episodes of LH, usually with standing  - Encouraged her to increase water intake to goal of at least 4 bottles per day  - Return precautions reviewed.          Gastroesophageal reflux disease K21.9     - Managed on famotidine 20mg every day  - Will increase to bid dosing         Relevant Medications    famotidine (Pepcid) 20 mg tablet    Other Relevant Orders    Follow Up In Primary Care - Established    Dysphagia R13.10     - Will proceed with GI evaluation for consideration of EGD for further evaluation         Relevant Orders    Referral to Gastroenterology    Follow Up In Primary Care - Established    Dyslipidemia E78.5     - Managed on Lipitor 10mg every day  - Continue statin         Relevant Orders    Follow Up In Primary Care - Established   Follow up in 3mo for recheck, sooner if needed. Labs prior to appt.      4 = No assist / stand by assistance

## 2025-03-15 PROBLEM — R22.1 NECK MASS: Status: RESOLVED | Noted: 2024-06-11 | Resolved: 2025-03-15

## 2025-03-15 PROBLEM — R13.10 DYSPHAGIA: Status: ACTIVE | Noted: 2025-03-15

## 2025-03-15 PROBLEM — R09.81 NASAL CONGESTION: Status: RESOLVED | Noted: 2024-12-11 | Resolved: 2025-03-15

## 2025-03-15 PROBLEM — R42 LIGHTHEADEDNESS: Status: ACTIVE | Noted: 2025-03-15

## 2025-03-15 ASSESSMENT — ENCOUNTER SYMPTOMS
LIGHT-HEADEDNESS: 1
COUGH: 0
ARTHRALGIAS: 1
CHILLS: 0
FEVER: 0
SHORTNESS OF BREATH: 0
DYSPHORIC MOOD: 0
PALPITATIONS: 0
NERVOUS/ANXIOUS: 0

## 2025-03-16 NOTE — ASSESSMENT & PLAN NOTE
- Diet-controlled  - HbA1c up from 7.0% to 7.2%  - We reviewed options for pharmacologic management  - Using shared decision making, we decided to trial metformin  - Medication dosing and side effects reviewed.  - She will continue to follow with dietician

## 2025-03-16 NOTE — ASSESSMENT & PLAN NOTE
- Having brief episodes of LH, usually with standing  - Encouraged her to increase water intake to goal of at least 4 bottles per day  - Return precautions reviewed.

## 2025-03-28 DIAGNOSIS — E11.9 TYPE 2 DIABETES MELLITUS WITHOUT COMPLICATION, WITHOUT LONG-TERM CURRENT USE OF INSULIN: ICD-10-CM

## 2025-03-28 RX ORDER — IBUPROFEN 200 MG
1 CAPSULE ORAL DAILY
Qty: 100 STRIP | Refills: 1 | Status: SHIPPED | OUTPATIENT
Start: 2025-03-28

## 2025-03-28 RX ORDER — LANCETS
1 EACH MISCELLANEOUS DAILY
Qty: 100 EACH | Refills: 1 | Status: SHIPPED | OUTPATIENT
Start: 2025-03-28

## 2025-03-28 RX ORDER — INSULIN PUMP SYRINGE, 3 ML
EACH MISCELLANEOUS
Qty: 1 EACH | Refills: 0 | Status: SHIPPED | OUTPATIENT
Start: 2025-03-28

## 2025-03-28 RX ORDER — IBUPROFEN 200 MG
1 CAPSULE ORAL DAILY
COMMUNITY
End: 2025-03-28 | Stop reason: SDUPTHER

## 2025-03-28 RX ORDER — LANCETS
1 EACH MISCELLANEOUS DAILY
COMMUNITY
End: 2025-03-28 | Stop reason: SDUPTHER

## 2025-05-30 DIAGNOSIS — E11.9 TYPE 2 DIABETES MELLITUS WITHOUT COMPLICATION, WITHOUT LONG-TERM CURRENT USE OF INSULIN: ICD-10-CM

## 2025-05-30 RX ORDER — DEXTROSE 4 G
TABLET,CHEWABLE ORAL
Qty: 1 EACH | Refills: 0 | Status: SHIPPED | OUTPATIENT
Start: 2025-05-30

## 2025-05-30 RX ORDER — IBUPROFEN 200 MG
1 CAPSULE ORAL 3 TIMES DAILY
Qty: 100 EACH | Refills: 1 | Status: SHIPPED | OUTPATIENT
Start: 2025-05-30

## 2025-06-16 ENCOUNTER — APPOINTMENT (OUTPATIENT)
Dept: PRIMARY CARE | Facility: CLINIC | Age: 55
End: 2025-06-16
Payer: MEDICAID

## 2025-06-16 VITALS
DIASTOLIC BLOOD PRESSURE: 70 MMHG | WEIGHT: 188 LBS | HEART RATE: 84 BPM | HEIGHT: 64 IN | SYSTOLIC BLOOD PRESSURE: 108 MMHG | BODY MASS INDEX: 32.1 KG/M2

## 2025-06-16 DIAGNOSIS — Z12.31 ENCOUNTER FOR SCREENING MAMMOGRAM FOR BREAST CANCER: ICD-10-CM

## 2025-06-16 DIAGNOSIS — M54.50 CHRONIC BILATERAL LOW BACK PAIN, UNSPECIFIED WHETHER SCIATICA PRESENT: ICD-10-CM

## 2025-06-16 DIAGNOSIS — G89.29 CHRONIC BILATERAL LOW BACK PAIN, UNSPECIFIED WHETHER SCIATICA PRESENT: ICD-10-CM

## 2025-06-16 DIAGNOSIS — E11.9 TYPE 2 DIABETES MELLITUS WITHOUT COMPLICATION, WITHOUT LONG-TERM CURRENT USE OF INSULIN: Primary | ICD-10-CM

## 2025-06-16 DIAGNOSIS — M25.511 ACUTE PAIN OF RIGHT SHOULDER: ICD-10-CM

## 2025-06-16 DIAGNOSIS — M06.9 RHEUMATOID ARTHRITIS, INVOLVING UNSPECIFIED SITE, UNSPECIFIED WHETHER RHEUMATOID FACTOR PRESENT (MULTI): ICD-10-CM

## 2025-06-16 DIAGNOSIS — E78.5 DYSLIPIDEMIA: ICD-10-CM

## 2025-06-16 DIAGNOSIS — R09.81 NASAL CONGESTION: ICD-10-CM

## 2025-06-16 PROCEDURE — 3008F BODY MASS INDEX DOCD: CPT | Performed by: STUDENT IN AN ORGANIZED HEALTH CARE EDUCATION/TRAINING PROGRAM

## 2025-06-16 PROCEDURE — 3074F SYST BP LT 130 MM HG: CPT | Performed by: STUDENT IN AN ORGANIZED HEALTH CARE EDUCATION/TRAINING PROGRAM

## 2025-06-16 PROCEDURE — 3078F DIAST BP <80 MM HG: CPT | Performed by: STUDENT IN AN ORGANIZED HEALTH CARE EDUCATION/TRAINING PROGRAM

## 2025-06-16 PROCEDURE — 99214 OFFICE O/P EST MOD 30 MIN: CPT | Performed by: STUDENT IN AN ORGANIZED HEALTH CARE EDUCATION/TRAINING PROGRAM

## 2025-06-16 RX ORDER — DEXTROMETHORPHAN HYDROBROMIDE, GUAIFENESIN 5; 100 MG/5ML; MG/5ML
650 LIQUID ORAL EVERY 8 HOURS PRN
COMMUNITY

## 2025-06-16 RX ORDER — LANCETS
1 EACH MISCELLANEOUS 2 TIMES DAILY
COMMUNITY
End: 2025-06-16 | Stop reason: SDUPTHER

## 2025-06-16 NOTE — PROGRESS NOTES
"Subjective   Patient ID: Janee Duval is a 54 y.o. female who presents for 3 month check with labs    HPI  R shoulder - woke up with pain in the R shoulder Thursday (4d ago) morning, started posterior shoulder and now radiating to anterior aspect, worse with ROM, she is unable to recall injury or trauma/trigger, she denies radiation of pain, numbness, tingling, weakness, no worse and no better, naproxen has not helped in the past, she has not tried heat or Tylenol    DM2 - managed on metformin xr 500mg every day, HbA1c 7.2%, she prefers to continue on current dose of metformin for now, BG sometimes >200 at dinner, she continues to follow with nutritionist, she is down 5lb, she is due for eye check and plans to schedule    HLD - managed on Lipitor 10mg every day which she tolerates    Chronic back pain - referred to pain management by rheumatology, was referred to Dr. Larios at [facility]    Cervical Cancer Screening: s/p hyst, follows with gynonc  Breast Cancer Screening: due  Osteoporosis Screening: plan to start at age 65  Colon Cancer Screening: Dr. Ortiz 10/14/2024, due 10/2034  Tobacco: 20 pack-yr hx, down to about 4 cigarettes per day, LDCT due 9/2025  Alcohol: social, occasional  Recreational Drugs: denies  Immunizations: due for TDaP, she plans to get Shingrix at pharmacy, declines influenza    Review of Systems   Constitutional:  Negative for chills and fever.   Respiratory:  Negative for cough and shortness of breath.    Cardiovascular:  Negative for chest pain and palpitations.   Musculoskeletal:  Positive for arthralgias.   Skin:  Negative for rash.   Psychiatric/Behavioral:  Negative for dysphoric mood. The patient is not nervous/anxious.      Objective   /70   Pulse 84   Ht 1.626 m (5' 4\")   Wt 85.3 kg (188 lb)   BMI 32.27 kg/m²     Physical Exam  Constitutional:       Appearance: Normal appearance.   HENT:      Head: Normocephalic and atraumatic.   Eyes:      General: No scleral icterus.     " Conjunctiva/sclera: Conjunctivae normal.   Cardiovascular:      Rate and Rhythm: Normal rate and regular rhythm.   Pulmonary:      Effort: Pulmonary effort is normal. No respiratory distress.      Breath sounds: Normal breath sounds.   Musculoskeletal:      Right upper arm: Tenderness (mild deltoid muscle belly) present. No swelling, edema, deformity or bony tenderness.      Cervical back: Normal range of motion and neck supple.   Skin:     General: Skin is warm.   Neurological:      General: No focal deficit present.      Mental Status: She is alert.   Psychiatric:         Mood and Affect: Mood normal.         Behavior: Behavior normal.       Assessment/Plan   Problem List Items Addressed This Visit           ICD-10-CM    Type 2 diabetes mellitus without complication, without long-term current use of insulin - Primary E11.9    - Managed on metformin xr 500mg every day  - HbA1c stable at 7.2%  - We reviewed options for pharmacologic management  - Using shared decision making, we decided to continue at this time without change  - She will continue to follow with dietician  - She was encourage to schedule eye check         Relevant Medications    lancets (Accu-Chek Softclix Lancets) misc    Other Relevant Orders    Hemoglobin A1C    Comprehensive Metabolic Panel    Follow Up In Primary Care - Established    Rheumatoid arthritis M06.9    - Continue close follow up with rheumatology as previously scheduled         Relevant Orders    Follow Up In Primary Care - Established    Dyslipidemia E78.5    - Managed on Lipitor 10mg every day  - Continue statin         Relevant Orders    Follow Up In Primary Care - Established    Chronic bilateral low back pain M54.50, G89.29    - Pain management eval pending         Acute pain of right shoulder M25.511    - x5d after no apparent trauma or injury  - Exam today largely unrevealing   -We reviewed conservative measures  - Return precautions reviewed.           Other Visit Diagnoses          Codes      Encounter for screening mammogram for breast cancer     Z12.31    Relevant Orders    BI mammo bilateral screening tomosynthesis    Follow Up In Primary Care - Established        Follow up in 3mo for recheck, sooner if needed. Labs prior to appt.

## 2025-06-20 RX ORDER — FLUTICASONE PROPIONATE 50 MCG
SPRAY, SUSPENSION (ML) NASAL
Qty: 16 ML | Refills: 1 | Status: SHIPPED | OUTPATIENT
Start: 2025-06-20

## 2025-06-21 PROBLEM — R42 LIGHTHEADEDNESS: Status: RESOLVED | Noted: 2025-03-15 | Resolved: 2025-06-21

## 2025-06-21 PROBLEM — G89.29 CHRONIC BILATERAL LOW BACK PAIN: Status: ACTIVE | Noted: 2025-06-21

## 2025-06-21 PROBLEM — M25.511 ACUTE PAIN OF RIGHT SHOULDER: Status: ACTIVE | Noted: 2025-06-21

## 2025-06-21 PROBLEM — M54.50 CHRONIC BILATERAL LOW BACK PAIN: Status: ACTIVE | Noted: 2025-06-21

## 2025-06-21 RX ORDER — LANCETS
1 EACH MISCELLANEOUS 2 TIMES DAILY
Qty: 200 EACH | Refills: 1 | Status: SHIPPED | OUTPATIENT
Start: 2025-06-21

## 2025-06-21 ASSESSMENT — ENCOUNTER SYMPTOMS
SHORTNESS OF BREATH: 0
COUGH: 0
NERVOUS/ANXIOUS: 0
DYSPHORIC MOOD: 0
ARTHRALGIAS: 1
PALPITATIONS: 0
FEVER: 0
CHILLS: 0

## 2025-06-22 NOTE — ASSESSMENT & PLAN NOTE
- x5d after no apparent trauma or injury  - Exam today largely unrevealing   -We reviewed conservative measures  - Return precautions reviewed.

## 2025-06-22 NOTE — ASSESSMENT & PLAN NOTE
- Managed on metformin xr 500mg every day  - HbA1c stable at 7.2%  - We reviewed options for pharmacologic management  - Using shared decision making, we decided to continue at this time without change  - She will continue to follow with dietician  - She was encourage to schedule eye check

## 2025-06-30 ENCOUNTER — HOSPITAL ENCOUNTER (OUTPATIENT)
Dept: RADIOLOGY | Facility: CLINIC | Age: 55
Discharge: HOME | End: 2025-06-30
Payer: MEDICAID

## 2025-06-30 VITALS — BODY MASS INDEX: 32.1 KG/M2 | WEIGHT: 188 LBS | HEIGHT: 64 IN

## 2025-06-30 DIAGNOSIS — Z12.31 ENCOUNTER FOR SCREENING MAMMOGRAM FOR BREAST CANCER: ICD-10-CM

## 2025-06-30 PROCEDURE — 77063 BREAST TOMOSYNTHESIS BI: CPT | Performed by: RADIOLOGY

## 2025-06-30 PROCEDURE — 77067 SCR MAMMO BI INCL CAD: CPT | Performed by: RADIOLOGY

## 2025-06-30 PROCEDURE — 77063 BREAST TOMOSYNTHESIS BI: CPT

## 2025-07-08 ENCOUNTER — TELEPHONE (OUTPATIENT)
Age: 55
End: 2025-07-08
Payer: MEDICAID

## 2025-07-08 NOTE — TELEPHONE ENCOUNTER
----- Message from Aria Ny sent at 7/7/2025 12:58 PM EDT -----  Please let Janee know that her mammogram is normal. Her risk is a little elevated based on the questionnaire she completed, so additional testing is offered if she is interested. Otherwise, we will   plan to repeat in 1yr. Thank you  ----- Message -----  From: Interface, Radiology Results In  Sent: 7/1/2025   8:42 AM EDT  To: Aria Ny, DO

## 2025-08-15 DIAGNOSIS — Z12.31 ENCOUNTER FOR SCREENING MAMMOGRAM FOR BREAST CANCER: Primary | ICD-10-CM

## 2025-09-04 ENCOUNTER — APPOINTMENT (OUTPATIENT)
Dept: RADIOLOGY | Facility: HOSPITAL | Age: 55
End: 2025-09-04
Payer: MEDICAID

## 2025-09-16 ENCOUNTER — APPOINTMENT (OUTPATIENT)
Dept: PRIMARY CARE | Facility: CLINIC | Age: 55
End: 2025-09-16
Payer: MEDICAID